# Patient Record
Sex: MALE | Race: BLACK OR AFRICAN AMERICAN | Employment: UNEMPLOYED | URBAN - METROPOLITAN AREA
[De-identification: names, ages, dates, MRNs, and addresses within clinical notes are randomized per-mention and may not be internally consistent; named-entity substitution may affect disease eponyms.]

---

## 2018-01-15 ENCOUNTER — APPOINTMENT (OUTPATIENT)
Dept: GENERAL RADIOLOGY | Facility: CLINIC | Age: 28
End: 2018-01-15
Attending: EMERGENCY MEDICINE

## 2018-01-15 ENCOUNTER — HOSPITAL ENCOUNTER (EMERGENCY)
Facility: CLINIC | Age: 28
Discharge: HOME | End: 2018-01-15
Attending: EMERGENCY MEDICINE | Admitting: EMERGENCY MEDICINE

## 2018-01-15 VITALS
RESPIRATION RATE: 18 BRPM | BODY MASS INDEX: 38.32 KG/M2 | HEIGHT: 65 IN | SYSTOLIC BLOOD PRESSURE: 140 MMHG | TEMPERATURE: 97.9 F | OXYGEN SATURATION: 100 % | DIASTOLIC BLOOD PRESSURE: 84 MMHG | WEIGHT: 230 LBS

## 2018-01-15 DIAGNOSIS — R07.9 ACUTE CHEST PAIN: ICD-10-CM

## 2018-01-15 DIAGNOSIS — S42.001A CLOSED DISPLACED FRACTURE OF RIGHT CLAVICLE, UNSPECIFIED PART OF CLAVICLE, INITIAL ENCOUNTER: ICD-10-CM

## 2018-01-15 LAB
ANION GAP SERPL CALCULATED.3IONS-SCNC: 7 MMOL/L (ref 3–14)
BASOPHILS # BLD AUTO: 0 10E9/L (ref 0–0.2)
BASOPHILS NFR BLD AUTO: 0.2 %
BUN SERPL-MCNC: 8 MG/DL (ref 7–30)
CALCIUM SERPL-MCNC: 8.9 MG/DL (ref 8.5–10.1)
CHLORIDE SERPL-SCNC: 106 MMOL/L (ref 94–109)
CO2 SERPL-SCNC: 27 MMOL/L (ref 20–32)
CREAT SERPL-MCNC: 0.93 MG/DL (ref 0.66–1.25)
DIFFERENTIAL METHOD BLD: NORMAL
EOSINOPHIL # BLD AUTO: 0.1 10E9/L (ref 0–0.7)
EOSINOPHIL NFR BLD AUTO: 1 %
ERYTHROCYTE [DISTWIDTH] IN BLOOD BY AUTOMATED COUNT: 13.9 % (ref 10–15)
GFR SERPL CREATININE-BSD FRML MDRD: >90 ML/MIN/1.7M2
GLUCOSE SERPL-MCNC: 92 MG/DL (ref 70–99)
HCT VFR BLD AUTO: 46.5 % (ref 40–53)
HGB BLD-MCNC: 15.7 G/DL (ref 13.3–17.7)
IMM GRANULOCYTES # BLD: 0 10E9/L (ref 0–0.4)
IMM GRANULOCYTES NFR BLD: 0.3 %
INTERPRETATION ECG - MUSE: NORMAL
LYMPHOCYTES # BLD AUTO: 2.1 10E9/L (ref 0.8–5.3)
LYMPHOCYTES NFR BLD AUTO: 33.9 %
MCH RBC QN AUTO: 32.1 PG (ref 26.5–33)
MCHC RBC AUTO-ENTMCNC: 33.8 G/DL (ref 31.5–36.5)
MCV RBC AUTO: 95 FL (ref 78–100)
MONOCYTES # BLD AUTO: 0.7 10E9/L (ref 0–1.3)
MONOCYTES NFR BLD AUTO: 10.9 %
NEUTROPHILS # BLD AUTO: 3.3 10E9/L (ref 1.6–8.3)
NEUTROPHILS NFR BLD AUTO: 53.7 %
NRBC # BLD AUTO: 0 10*3/UL
NRBC BLD AUTO-RTO: 0 /100
PLATELET # BLD AUTO: 186 10E9/L (ref 150–450)
POTASSIUM SERPL-SCNC: 3.8 MMOL/L (ref 3.4–5.3)
RBC # BLD AUTO: 4.89 10E12/L (ref 4.4–5.9)
SODIUM SERPL-SCNC: 141 MMOL/L (ref 133–144)
TROPONIN I SERPL-MCNC: <0.015 UG/L (ref 0–0.04)
WBC # BLD AUTO: 6.1 10E9/L (ref 4–11)

## 2018-01-15 PROCEDURE — 99284 EMERGENCY DEPT VISIT MOD MDM: CPT | Mod: 25 | Performed by: EMERGENCY MEDICINE

## 2018-01-15 PROCEDURE — 25000128 H RX IP 250 OP 636: Performed by: EMERGENCY MEDICINE

## 2018-01-15 PROCEDURE — 71046 X-RAY EXAM CHEST 2 VIEWS: CPT

## 2018-01-15 PROCEDURE — 93005 ELECTROCARDIOGRAM TRACING: CPT | Performed by: EMERGENCY MEDICINE

## 2018-01-15 PROCEDURE — 85025 COMPLETE CBC W/AUTO DIFF WBC: CPT | Performed by: EMERGENCY MEDICINE

## 2018-01-15 PROCEDURE — 84484 ASSAY OF TROPONIN QUANT: CPT | Performed by: EMERGENCY MEDICINE

## 2018-01-15 PROCEDURE — 73030 X-RAY EXAM OF SHOULDER: CPT | Mod: RT

## 2018-01-15 PROCEDURE — 93010 ELECTROCARDIOGRAM REPORT: CPT | Mod: Z6 | Performed by: EMERGENCY MEDICINE

## 2018-01-15 PROCEDURE — 99285 EMERGENCY DEPT VISIT HI MDM: CPT | Mod: 25 | Performed by: EMERGENCY MEDICINE

## 2018-01-15 PROCEDURE — 96374 THER/PROPH/DIAG INJ IV PUSH: CPT | Performed by: EMERGENCY MEDICINE

## 2018-01-15 PROCEDURE — 80048 BASIC METABOLIC PNL TOTAL CA: CPT | Performed by: EMERGENCY MEDICINE

## 2018-01-15 RX ORDER — IBUPROFEN 600 MG/1
600 TABLET, FILM COATED ORAL EVERY 6 HOURS PRN
Qty: 30 TABLET | Refills: 1 | Status: SHIPPED | OUTPATIENT
Start: 2018-01-15

## 2018-01-15 RX ORDER — KETOROLAC TROMETHAMINE 30 MG/ML
30 INJECTION, SOLUTION INTRAMUSCULAR; INTRAVENOUS ONCE
Status: COMPLETED | OUTPATIENT
Start: 2018-01-15 | End: 2018-01-15

## 2018-01-15 RX ADMIN — KETOROLAC TROMETHAMINE 30 MG: 30 INJECTION, SOLUTION INTRAMUSCULAR at 11:59

## 2018-01-15 ASSESSMENT — ENCOUNTER SYMPTOMS
COUGH: 1
RHINORRHEA: 0
SHORTNESS OF BREATH: 1

## 2018-01-15 NOTE — ED PROVIDER NOTES
"  History     Chief Complaint   Patient presents with     Chest Pain     HPI  Modesto Coelho is a 27 year old male with a reported history of hypertension who presents with chest pain and right clavicle pain.  Patient reports that he has been experiencing intermittent upper chest pain over the past 2-3 days with associated shortness of breath.  He reports that he does follow with the PCP and reports that he had previously been on medication for hypertension, not currently.  He notes that he is supposed to have a physical soon and is going to try to get on a different medication at that time.  No drugs he denies any history of hyperlipidemia.  He reports that he does have a family history of heart disease, his father recently had an MI at age 48.  He describes his chest pain as \"squeezing\" and rates it at an 8 out of 10 currently.  He denies diaphoresis.  Patient reports that he has been under stress recently.  He denies being on any medications currently.  He also denies any known drug allergies.  He has had a recent cough, denies runny nose.  He is a current smoker.    The patient also reports that he fell getting off of the train this morning, landing on his right shoulder.  He complains of right clavicular pain since then.    History reviewed. No pertinent past medical history.    History reviewed. No pertinent surgical history.    No family history on file.    Social History   Substance Use Topics     Smoking status: Current Every Day Smoker     Smokeless tobacco: Never Used     Alcohol use Yes     No current facility-administered medications for this encounter.      Current Outpatient Prescriptions   Medication     ibuprofen (ADVIL/MOTRIN) 600 MG tablet        Allergies   Allergen Reactions     Acetaminophen Itching      I have reviewed the Medications, Allergies, Past Medical and Surgical History, and Social History in the Epic system.    Review of Systems   HENT: Negative for rhinorrhea.    Respiratory: " "Positive for cough and shortness of breath.    Cardiovascular: Positive for chest pain (intermittent).   Musculoskeletal:        Positive for right clavicular pain.   All other systems reviewed and are negative.      Physical Exam   BP: (!) 178/101  Heart Rate: 75  Temp: 97.9  F (36.6  C)  Resp: 18  Height: 165.1 cm (5' 5\")  Weight: 104.3 kg (230 lb)  SpO2: 100 %      Physical Exam Exam:  Constitutional: healthy, alert and no distress  Head: Normocephalic. No masses, lesions, tenderness or abnormalities  Neck: Neck supple. No adenopathy. Thyroid symmetric, normal size,, Carotids without bruits.  ENT: ENT exam normal, no neck nodes or sinus tenderness  Cardiovascular: negative, PMI normal. No lifts, heaves, or thrills. RRR. No murmurs, clicks gallops or rub  Respiratory: negative, Percussion normal. Good diaphragmatic excursion. Lungs clear  Gastrointestinal: Abdomen soft, non-tender. BS normal. No masses, organomegaly  : Deferred  Musculoskeletal: extremities normal- no gross deformities noted, gait normal and normal muscle tone  Skin: no suspicious lesions or rashes  Neurologic: Gait normal. Reflexes normal and symmetric. Sensation grossly WNL.  Psychiatric: mentation appears normal and affect normal/bright  Hematologic/Lymphatic/Immunologic: Normal cervical lymph nodes        ED Course     ED Course     Procedures     11:51 AM  The patient was seen and examined by Dr. Ferris in Room 9.               EKG Interpretation:      Interpreted by Adolfo Ferris MD  Time reviewed: 11:55 AM  Symptoms at time of EKG: chest pain    Rhythm: normal sinus with sinus arrhythmia  Rate: 71 bpm  Axis: Normal  Ectopy: none  Conduction: normal  ST Segments/ T Waves: No ST-T wave changes  Q Waves: none  Comparison to prior: No old EKG available    Clinical Impression: normal EKG       Labs Ordered and Resulted from Time of ED Arrival Up to the Time of Departure from the ED   CBC WITH PLATELETS DIFFERENTIAL   BASIC METABOLIC PANEL "   TROPONIN I     XR Shoulder Right G/E 3 Views (Final result) Result time: 01/15/18 13:41:49     Final result by Peter Almanzar DO (01/15/18 13:41:49)     Impression:     Impression:  Subacute appearing fracture of the middle third right clavicle.    PETER ALMANZAR MD (Joe)     Narrative:     4 views right shoulder radiographs 1/15/2018 1:39 PM    History: s/p fall;      Comparison: None    Findings:    AP, Grashey, transscapular Y, axillary  views of the right shoulder  were obtained.     Subacute appearing fracture of the middle third of the right clavicle  with associated bony callus formation. Inferior displacement of the  lateral clavicle relative to the medial clavicle. Normal  acromioclavicular and coracoclavicular intervals.    Glenohumeral and acromioclavicular joints are congruent.    Mild degenerative changes of the acromioclavicular joint.  No  substantial degenerative change of the glenohumeral joint.    Soft tissue is unremarkable.  The visualized lung is clear.                 XR Chest 2 Views (Final result) Result time: 01/15/18 15:24:38     Final result by Riki Coello MD (01/15/18 15:24:38)     Impression:     Impression:   1.  No acute cardiopulmonary abnormalities.  2.  Healing fracture of the right clavicle.    I have personally reviewed the examination and initial interpretation  and I agree with the findings.    RIKI COELLO MD     Narrative:     Exam:  Chest X-ray 1/15/2018 1:28 PM    History: SOB;     Comparison: None    Findings: PA and lateral radiographs of the chest are obtained.  Trachea is midline. Cardiomediastinal silhouette is within normal  limits. No pneumothorax. No pleural effusion. No focal pulmonary  opacities. There is a partially displaced fracture of the right mid  clavicle with callus formation, consistent with healing fracture. The  upper abdomen is unremarkable.                  Assessments & Plan (with Medical Decision Making)   MDM  This is  a 27-year-old male who is here with 3-4 days of chest pain.  Patient also states that as he was getting off the train today, he had slipped and fell and injured his right shoulder.  Patient states that there is no numbness tingling or weakness of his right upper extremity.  However, upon movement of his right shoulder there is increasing in pain.  Patient states that pain is most pronounced in the clavicular area of the right side.  Chest pain is described as squeezing type with last 2-3 days and he was concerned since he does have history in his family of coronary related heart disease, he was  concern for himself as well.  Patient denies any other symptoms except intermittent shortness of breath.  Patient states not related to any activity.  No fevers or chills.  No diaphoresis.  Physical exam is unremarkable with this right upper extremity showing full range of motion with some decreased secondary to pain.  Differential diagnosis includes ACS as well as possible right upper extremity injury.  We will get a chest x-ray, EKG and blood work.  We will also acquire right shoulder film.    Shoulder and the chest x-ray reveals subacute fracture of the middle third right clavicle.  Given his history of these symptoms, and subacute nature of this fracture, I do believe that he may have injured fracture which she has had in the past.  Patient to return if any worsening symptoms.  Chest x-ray workup was unremarkable at this time and I do not believe that this is a coronary event.  Patient to follow with primary care physician if any worsening symptoms.  Patient will be discharged home with ibuprofen.    I have reviewed the nursing notes.    I have reviewed the findings, diagnosis, plan and need for follow up with the patient.    Discharge Medication List as of 1/15/2018  2:11 PM      START taking these medications    Details   ibuprofen (ADVIL/MOTRIN) 600 MG tablet Take 1 tablet (600 mg) by mouth every 6 hours as needed for  moderate pain, Disp-30 tablet, R-1, Local Print             Final diagnoses:   Acute chest pain   Closed displaced fracture of right clavicle, unspecified part of clavicle, initial encounter     I, Thierno Cardozo, am serving as a trained medical scribe to document services personally performed by Adolfo Ferris MD, based on the provider's statements to me.   IAdolfo MD, was physically present and have reviewed and verified the accuracy of this note documented by Thierno Cardozo.     1/15/2018   [facility], [place], EMERGENCY DEPARTMENT     Adolfo Ferris MD  02/01/18 1036

## 2018-01-15 NOTE — ED NOTES
PT left before DC paperwork. PT was found in the lobby waiting for his car. PT reports that he took out his own IV, however this was not verified by RN.

## 2018-01-15 NOTE — ED NOTES
Patient presents ambulatory to the ED with complaints of chest pain x 2-3 days and intermittent shortness of breath. Patient also reports recently fell and is worried he has a fracture to the right shoulder.

## 2018-01-15 NOTE — DISCHARGE INSTRUCTIONS
Please make an appointment to follow up with   Your Primary Care Provider, OR  [facility] (phone: [phone] OR  [facility] (phone: [phone]) in 7-10 days    *CHEST PAIN, NONCARDIAC    Based on your visit today, the exact cause of your chest pain is not certain. Your condition does not seem serious and your pain does not appear to be coming from your heart. However, sometimes the signs of a serious problem take more time to appear. Therefore, please watch for the warning signs listed below.  HOME CARE:  1. Rest today and avoid strenuous activity.  2. Take any prescribed medicine as directed.  FOLLOW UP with your doctor in 1-3 days.   GET PROMPT MEDICAL ATTENTION if any of the following occur:    A change in the type of pain: if it feels different, becomes more severe, lasts longer, or begins to spread into your shoulder, arm, neck, jaw or back    Shortness of breath or increased pain with breathing    Cough with blood or dark colored sputum (phlegm)    Weakness, dizziness, or fainting    Fever over 101  F (38.3  C)    Swelling, pain or redness in one leg    1433-7199 [employer]. [address] [place] [address]. All rights reserved. This information is not intended as a substitute for professional medical care. Always follow your healthcare professional's instructions.  This information has been modified by your health care provider with permission from the publisher.      Collarbone Fracture  You have a break (fracture) in your collarbone (clavicle). This will cause swelling, pain, and bruising. The first few weeks will be the most painful. This is because deep breathing, coughing, or changing position from sitting to lying down may cause the broken ends to move slightly.   The fracture will heal in about 4 to 6 weeks. Most people can return to normal activities in about 3 months. In children, this injury will heal by reshaping the bone back to normal.  In adults, a noticeable bump in the bone may remain.  Treatment is with a sling or a special type of arm sling called a shoulder immobilizer. This supports your arm and eases pain.  Home care  Follow these guidelines when caring for yourself or your child at home:    Put an ice pack on the injured area. Do this for 20 minutes every 1 to 2 hours on the first day. You can make an ice pack by wrapping a plastic bag of ice cubes in a thin towel. Keep using the ice pack 3 to 4 times a day for the next 2 days. Then use it as needed to ease pain and swelling.    If you were given a sling or shoulder immobilizer, wear it for comfort. You may take it off when you bathe or sleep. Take your arm out of the sling for a little while each day and move your shoulder, elbow, wrist, and hand to keep them from getting stiff.    Don t do any heavy lifting or raise the injured arm overhead until you are pain-free. Your child shouldn t play sports or do physical education class for at least 4 weeks, or until the healthcare provider says it s OK to do so.    You may use acetaminophen or ibuprofen to control pain, unless another pain medicine was prescribed. If you have chronic liver or kidney disease, talk with your healthcare provider before using these medicines. Also talk with your provider if you ve had a stomach ulcer or gastrointestinal bleeding.    Your doctor may refer you to physical therapy for shoulder exercises once it starts to heal.    You may need surgery if the bones are out of place (displaced). Surgery will put them in better alignment while they heal. This leads to better strength when you have healed.  Follow-up care  Follow up with your healthcare provider within 1 week, or as advised. This is to be sure the bone is healing the way it should.  X-rays are occasionally taken of the fracture. You will be told of any new findings that may affect your care.  When to seek medical advice  Call your healthcare provider right  away if any of these occur:    Swelling in your collarbone gets worse or the skin in the area becomes pale or discolored    Large area of bruising over the collarbone    Fingers become swollen, cold, blue, numb, or tingly    Shortness of breath, dizziness, or general weakness    Weakness or swelling in your arm    Any redness, drainage, or pus coming from the wound  Date Last Reviewed: 1/1/2017 2000-2017 [employer]. [address] [place] [address]. All rights reserved. This information is not intended as a substitute for professional medical care. Always follow your healthcare professional's instructions.

## 2018-10-17 ENCOUNTER — APPOINTMENT (OUTPATIENT)
Dept: GENERAL RADIOLOGY | Facility: CLINIC | Age: 28
End: 2018-10-17
Attending: EMERGENCY MEDICINE

## 2018-10-17 ENCOUNTER — HOSPITAL ENCOUNTER (EMERGENCY)
Facility: CLINIC | Age: 28
Discharge: HOME | End: 2018-10-17
Attending: EMERGENCY MEDICINE | Admitting: EMERGENCY MEDICINE

## 2018-10-17 VITALS
TEMPERATURE: 97.4 F | OXYGEN SATURATION: 98 % | RESPIRATION RATE: 16 BRPM | HEIGHT: 65 IN | SYSTOLIC BLOOD PRESSURE: 153 MMHG | BODY MASS INDEX: 39.99 KG/M2 | HEART RATE: 75 BPM | DIASTOLIC BLOOD PRESSURE: 98 MMHG | WEIGHT: 240 LBS

## 2018-10-17 DIAGNOSIS — M76.32 TENDINITIS OF ILIOTIBIAL BAND, LEFT: ICD-10-CM

## 2018-10-17 DIAGNOSIS — G89.29 CHRONIC PAIN OF LEFT KNEE: ICD-10-CM

## 2018-10-17 DIAGNOSIS — M25.562 CHRONIC PAIN OF LEFT KNEE: ICD-10-CM

## 2018-10-17 PROCEDURE — 73562 X-RAY EXAM OF KNEE 3: CPT | Mod: LT

## 2018-10-17 PROCEDURE — 99283 EMERGENCY DEPT VISIT LOW MDM: CPT | Mod: Z6 | Performed by: EMERGENCY MEDICINE

## 2018-10-17 PROCEDURE — 99283 EMERGENCY DEPT VISIT LOW MDM: CPT | Performed by: EMERGENCY MEDICINE

## 2018-10-17 RX ORDER — LIDOCAINE 50 MG/G
PATCH TOPICAL
Qty: 15 PATCH | Refills: 0 | Status: SHIPPED | OUTPATIENT
Start: 2018-10-17

## 2018-10-17 RX ORDER — NAPROXEN 500 MG/1
500 TABLET ORAL 2 TIMES DAILY PRN
Qty: 24 TABLET | Refills: 0 | Status: SHIPPED | OUTPATIENT
Start: 2018-10-17 | End: 2018-10-25

## 2018-10-17 NOTE — ED TRIAGE NOTES
"Patient reports an ACL surgery in 2014 on left knee. He complains that his knee hurts every day and does not hurt more today than any other day. He states \"the screws could be moving\". Patient ambulated to room without difficulty.   "

## 2018-10-17 NOTE — DISCHARGE INSTRUCTIONS
Please make an appointment to follow up with Orthopedics (phone: [phone]) or Sports Medicine (phone: [phone]) in 1-2 weeks.     Return to the ED if you are having redness/swelling/warmth of the knee, fever, worsening symptoms, or any other urgent/life-threatening concerns.

## 2018-10-17 NOTE — ED AVS SNAPSHOT
[facility], Emergency Department    [address] [address]    Phone:  [phone]                                       Modesto Coelho   MRN: [id number]    Department:  [facility], Emergency Department   Date of Visit:  10/17/2018           Patient Information     Date Of Birth          1990        Your diagnoses for this visit were:     Chronic pain of left knee     Tendinitis of iliotibial band, left        You were seen by Pravin Rehman MD.        Discharge Instructions       Please make an appointment to follow up with Orthopedics (phone: [phone]) or Sports Medicine (phone: [phone]) in 1-2 weeks.     Return to the ED if you are having redness/swelling/warmth of the knee, fever, worsening symptoms, or any other urgent/life-threatening concerns.       24 Hour Appointment Hotline       To make an appointment at any [facility] clinic, call [phone] ([phone]). If you don't have a family doctor or clinic, we will help you find one. [facility] clinics are conveniently located to serve the needs of you and your family.             Review of your medicines      START taking        Dose / Directions Last dose taken    lidocaine 5 % Patch   Commonly known as:  LIDODERM   Quantity:  15 patch        Apply up to 3 patches to painful area at once for up to 12 h within a 24 h period.  Remove after 12 hours.   Refills:  0        naproxen 500 MG tablet   Commonly known as:  NAPROSYN   Dose:  500 mg   Quantity:  24 tablet        Take 1 tablet (500 mg) by mouth 2 times daily as needed for moderate pain   Refills:  0          Our records show that you are taking the medicines listed below. If these are incorrect, please call your family doctor or clinic.        Dose / Directions Last dose taken    ibuprofen 600 MG tablet   Commonly known as:  ADVIL/MOTRIN   Dose:  600 mg   Quantity:  30 tablet        Take 1 tablet (600 mg) by mouth every 6 hours as needed for moderate  "pain   Refills:  1                Prescriptions were sent or printed at these locations (2 Prescriptions)                   Other Prescriptions                Printed at Department/Unit printer (2 of 2)         lidocaine (LIDODERM) 5 % Patch               naproxen (NAPROSYN) 500 MG tablet                Procedures and tests performed during your visit     XR Knee Left 3 Views      Orders Needing Specimen Collection     None      Pending Results     Date and Time Order Name Status Description    10/17/2018 0036 XR Knee Left 3 Views Preliminary             Pending Culture Results     No orders found from 10/15/2018 to 10/18/2018.            Pending Results Instructions     If you had any lab results that were not finalized at the time of your Discharge, you can call the ED Lab Result RN at [phone]. You will be contacted by this team for any positive Lab results or changes in treatment. The nurses are available 7 days a week from 10A to 6:30P.  You can leave a message 24 hours per day and they will return your call.        Thank you for choosing [facility]       Thank you for choosing [facility] for your care. Our goal is always to provide you with excellent care. Hearing back from our patients is one way we can continue to improve our services. Please take a few minutes to complete the written survey that you may receive in the mail after you visit with us. Thank you!        [employer] Information     [employer] lets you send messages to your doctor, view your test results, renew your prescriptions, schedule appointments and more. To sign up, go to [email or web address] . Click on \"Log in\" on the left side of the screen, which will take you to the Welcome page. Then click on \"Sign up Now\" on the right side of the page.     You will be asked to enter the access code listed below, as well as some personal information. Please follow the directions to create your username and password.     Your access code is: " [email or web address]  Expires: 1/15/2019  1:59 AM     Your access code will  in 90 days. If you need help or a new code, please call your [facility] clinic or [phone].        Care EveryWhere ID     This is your Care EveryWhere ID. This could be used by other organizations to access your [facility] medical records  [id number]        Equal Access to Services     DEANNA BARRERA : Hadii gosia echeverria Sorossana, waaxda luqadaha, qaybta kaalmada adenardada, julia faye . So [facility] [phone].    ATENCIÓN: Si habla español, tiene a mccurdy disposición servicios gratuitos de asistencia lingüística. Llame al [phone].    We comply with applicable federal civil rights laws and [place] laws. We do not discriminate on the basis of race, color, national origin, age, disability, sex, sexual orientation, or gender identity.            After Visit Summary       This is your record. Keep this with you and show to your community pharmacist(s) and doctor(s) at your next visit.

## 2018-10-17 NOTE — ED AVS SNAPSHOT
[facility], [place], Emergency Department    [address] [place] [address]    Phone:  [phone]                                       Modesto Coelho   MRN: [id number]    Department:  [facility], Emergency Department   Date of Visit:  10/17/2018           After Visit Summary Signature Page     I have received my discharge instructions, and my questions have been answered. I have discussed any challenges I see with this plan with the nurse or doctor.    ..........................................................................................................................................  Patient/Patient Representative Signature      ..........................................................................................................................................  Patient Representative Print Name and Relationship to Patient    ..................................................               ................................................  Date                                   Time    ..........................................................................................................................................  Reviewed by Signature/Title    ...................................................              ..............................................  Date                                               Time          22EPIC Rev 08/18

## 2018-10-17 NOTE — ED PROVIDER NOTES
"    [place] EMERGENCY DEPARTMENT ([facility])  10/17/18 Room 21  History     Chief Complaint   Patient presents with     Knee Pain     HPI  Modesto Coelho is a 28 year old male with history of left ACL repair several years ago who presents with knee pain.  Patient reports he has had pain in the knee.  Since the surgery.  This is recently been getting worse.  He states that when he is walking more frequently or the weather gets cold at any time the pain tends to get worse.  He expresses frustration with it.  He received other other facilities.  He notes that the surgery was completed through [facility] but that his surgeon is since retired, has been getting most of his care since that [facility].  He reports many knee x-rays and a knee MRI that were structurally normal.  Patient takes ibuprofen as needed for the pain, walks frequently.  No new trauma.      I have reviewed the Medications, Allergies, Past Medical and Surgical History, and Social History in the Epic system.    Review of Systems  A complete review of systems was performed with pertinent positives and negatives noted in the HPI, and all other systems negative.     Physical Exam   BP: (!) 174/110  Pulse: 98  Temp: 97.4  F (36.3  C)  Resp: 16  Height: 165.1 cm (5' 5\")  Weight: 108.9 kg (240 lb)  SpO2: 98 %      Physical Exam  BP (!) 174/110  Pulse 98  Temp 97.4  F (36.3  C) (Oral)  Resp 16  Ht 1.651 m (5' 5\")  Wt 108.9 kg (240 lb)  SpO2 98%  BMI 39.94 kg/m2  General: well-appearing, no acute distress  HENT: MMM, no oropharyngeal lesions  Eyes: PERRL, normal sclerae   Cardio: Regular rate, extremities well perfused  Resp: Normal work of breathing, normal respiratory rate  Neuro: alert and fully oriented. CN II-XII grossly intact. Grossly normal strength and sensation in all extremities.   MSK: no deformities. Left knee: tenderness over IT band and patellar tendon, minimal joint line tenderness laterally, Dio negative. ACL/PCL/LCL/MCL " intact to testing; no erythema, no swelling, no warmth.   Integumentary/Skin: no rash, normal color  Psych: normal affect, normal behavior      ED Course     ED Course     Procedures        Critical Care time:  none     Labs Ordered and Resulted from Time of ED Arrival Up to the Time of Departure from the ED - No data to display         Assessments & Plan (with Medical Decision Making)   Patient presenting with acute on chronic left knee pain. Vitals in the ED unremarkable. Initial differential diagnosis includes but not limited to tendinitis, arthritis, meniscal tear, ligament injury.     X-ray of the knee shows stable hardware, no acute pathology.  Exam with focal tenderness over the patella tendon in the left IT band near its insertion on the lateral aspect of the knee.  High clinical suspicion for tendinitis of these areas.  Pain does not seem to be within the knee joint itself.    The complete clinical picture is most consistent with acute on chronic knee pain with some aspect of tendinitis. After counseling on the diagnosis, work-up, and treatment plan, the patient was discharged to home. Naproxen and lidocaine patches prescription provided. The patient was advised to follow-up with sports medicine orthopedics within a couple weeks. The patient was advised to return to the ED if worsening symptoms, signs of infection, or if there are any urgent/life-threatening concerns.       Clinical Impression:  Acute on chronic left knee pain  Likely left knee IT band tendinitis     Pravin Rehman MD  Emergency Medicine       I have reviewed the nursing notes.    I have reviewed the findings, diagnosis, plan and need for follow up with the patient.    New Prescriptions    LIDOCAINE (LIDODERM) 5 % PATCH    Apply up to 3 patches to painful area at once for up to 12 h within a 24 h period.  Remove after 12 hours.    NAPROXEN (NAPROSYN) 500 MG TABLET    Take 1 tablet (500 mg) by mouth 2 times daily as needed for moderate  pain       Final diagnoses:   Chronic pain of left knee   Tendinitis of iliotibial band, left       10/17/2018   [facility], [place], EMERGENCY DEPARTMENT     Pravin Rehman MD  10/17/18 0155